# Patient Record
Sex: MALE | Race: WHITE | NOT HISPANIC OR LATINO | URBAN - METROPOLITAN AREA
[De-identification: names, ages, dates, MRNs, and addresses within clinical notes are randomized per-mention and may not be internally consistent; named-entity substitution may affect disease eponyms.]

---

## 2017-01-04 ENCOUNTER — COMMUNICATION - HEALTHEAST (OUTPATIENT)
Dept: SCHEDULING | Facility: CLINIC | Age: 72
End: 2017-01-04

## 2017-01-12 ENCOUNTER — OFFICE VISIT - HEALTHEAST (OUTPATIENT)
Dept: FAMILY MEDICINE | Facility: CLINIC | Age: 72
End: 2017-01-12

## 2017-01-12 DIAGNOSIS — R07.9 CHEST PAIN: ICD-10-CM

## 2017-01-12 DIAGNOSIS — Z71.1 CONCERN ABOUT GALLSTONES WITHOUT DIAGNOSIS: ICD-10-CM

## 2017-01-14 ENCOUNTER — HOSPITAL ENCOUNTER (OUTPATIENT)
Dept: ULTRASOUND IMAGING | Facility: HOSPITAL | Age: 72
Discharge: HOME | End: 2017-01-14
Attending: FAMILY MEDICINE

## 2017-01-14 DIAGNOSIS — Z71.1 CONCERN ABOUT GALLSTONES WITHOUT DIAGNOSIS: ICD-10-CM

## 2017-01-27 ENCOUNTER — RECORDS - HEALTHEAST (OUTPATIENT)
Dept: ADMINISTRATIVE | Facility: OTHER | Age: 72
End: 2017-01-27

## 2017-01-31 ENCOUNTER — COMMUNICATION - HEALTHEAST (OUTPATIENT)
Dept: FAMILY MEDICINE | Facility: CLINIC | Age: 72
End: 2017-01-31

## 2017-01-31 ENCOUNTER — AMBULATORY - HEALTHEAST (OUTPATIENT)
Dept: FAMILY MEDICINE | Facility: CLINIC | Age: 72
End: 2017-01-31

## 2017-01-31 DIAGNOSIS — R07.9 CHEST PAIN WITH HIGH RISK FOR CARDIAC ETIOLOGY: ICD-10-CM

## 2017-04-10 ENCOUNTER — COMMUNICATION - HEALTHEAST (OUTPATIENT)
Dept: NURSING | Facility: CLINIC | Age: 72
End: 2017-04-10

## 2017-04-10 DIAGNOSIS — E78.5 HYPERLIPIDEMIA: ICD-10-CM

## 2017-04-10 DIAGNOSIS — E11.00 TYPE 2 DIABETES MELLITUS WITH HYPEROSMOLARITY WITHOUT COMA, WITHOUT LONG-TERM CURRENT USE OF INSULIN (H): ICD-10-CM

## 2017-05-04 ENCOUNTER — OFFICE VISIT - HEALTHEAST (OUTPATIENT)
Dept: NURSING | Facility: CLINIC | Age: 72
End: 2017-05-04

## 2017-05-04 DIAGNOSIS — E78.5 DYSLIPIDEMIA: ICD-10-CM

## 2017-05-04 DIAGNOSIS — K21.9 GASTROESOPHAGEAL REFLUX DISEASE, ESOPHAGITIS PRESENCE NOT SPECIFIED: ICD-10-CM

## 2017-05-04 DIAGNOSIS — E11.9 TYPE 2 DIABETES MELLITUS WITHOUT COMPLICATION, WITHOUT LONG-TERM CURRENT USE OF INSULIN (H): ICD-10-CM

## 2017-05-04 LAB — HBA1C MFR BLD: 8 % (ref 3.5–6)

## 2017-05-04 RX ORDER — METFORMIN HCL 500 MG
1000 TABLET, EXTENDED RELEASE 24 HR ORAL 2 TIMES DAILY
Qty: 360 TABLET | Refills: 3 | Status: SHIPPED | OUTPATIENT
Start: 2017-05-04

## 2017-05-04 RX ORDER — GLUCOSAMINE HCL/CHONDROITIN SU 500-400 MG
CAPSULE ORAL
Qty: 200 EACH | Refills: 3 | Status: SHIPPED | OUTPATIENT
Start: 2017-05-04

## 2017-05-05 ENCOUNTER — COMMUNICATION - HEALTHEAST (OUTPATIENT)
Dept: NURSING | Facility: CLINIC | Age: 72
End: 2017-05-05

## 2017-05-09 ENCOUNTER — RECORDS - HEALTHEAST (OUTPATIENT)
Dept: ADMINISTRATIVE | Facility: OTHER | Age: 72
End: 2017-05-09

## 2017-07-22 ENCOUNTER — COMMUNICATION - HEALTHEAST (OUTPATIENT)
Dept: NURSING | Facility: CLINIC | Age: 72
End: 2017-07-22

## 2017-07-22 DIAGNOSIS — R03.0 ELEVATED BLOOD PRESSURE READING WITHOUT DIAGNOSIS OF HYPERTENSION: ICD-10-CM

## 2017-07-24 RX ORDER — LISINOPRIL 2.5 MG/1
TABLET ORAL
Qty: 90 TABLET | Refills: 3 | Status: SHIPPED | OUTPATIENT
Start: 2017-07-24

## 2017-10-12 ENCOUNTER — COMMUNICATION - HEALTHEAST (OUTPATIENT)
Dept: FAMILY MEDICINE | Facility: CLINIC | Age: 72
End: 2017-10-12

## 2017-10-12 DIAGNOSIS — E78.5 HYPERLIPIDEMIA: ICD-10-CM

## 2017-10-23 ENCOUNTER — RECORDS - HEALTHEAST (OUTPATIENT)
Dept: ADMINISTRATIVE | Facility: OTHER | Age: 72
End: 2017-10-23

## 2018-01-11 ENCOUNTER — COMMUNICATION - HEALTHEAST (OUTPATIENT)
Dept: FAMILY MEDICINE | Facility: CLINIC | Age: 73
End: 2018-01-11

## 2018-01-11 DIAGNOSIS — E78.5 HYPERLIPIDEMIA: ICD-10-CM

## 2018-01-11 RX ORDER — ATORVASTATIN CALCIUM 10 MG/1
10 TABLET, FILM COATED ORAL DAILY
Qty: 90 TABLET | Refills: 0 | Status: SHIPPED | OUTPATIENT
Start: 2018-01-11

## 2018-12-31 ENCOUNTER — COMMUNICATION - HEALTHEAST (OUTPATIENT)
Dept: FAMILY MEDICINE | Facility: CLINIC | Age: 73
End: 2018-12-31

## 2019-01-23 ENCOUNTER — COMMUNICATION - HEALTHEAST (OUTPATIENT)
Dept: FAMILY MEDICINE | Facility: CLINIC | Age: 74
End: 2019-01-23

## 2020-01-08 ENCOUNTER — COMMUNICATION - HEALTHEAST (OUTPATIENT)
Dept: CARDIOLOGY | Facility: CLINIC | Age: 75
End: 2020-01-08

## 2020-01-13 ENCOUNTER — AMBULATORY - HEALTHEAST (OUTPATIENT)
Dept: CARDIOLOGY | Facility: CLINIC | Age: 75
End: 2020-01-13

## 2020-01-13 DIAGNOSIS — I48.0 PAROXYSMAL ATRIAL FIBRILLATION (H): ICD-10-CM

## 2020-01-13 LAB
ATRIAL RATE - MUSE: 70 BPM
DIASTOLIC BLOOD PRESSURE - MUSE: NORMAL
INTERPRETATION ECG - MUSE: NORMAL
P AXIS - MUSE: 51 DEGREES
PR INTERVAL - MUSE: 152 MS
QRS DURATION - MUSE: 136 MS
QT - MUSE: 422 MS
QTC - MUSE: 455 MS
R AXIS - MUSE: -3 DEGREES
SYSTOLIC BLOOD PRESSURE - MUSE: NORMAL
T AXIS - MUSE: 6 DEGREES
VENTRICULAR RATE- MUSE: 70 BPM

## 2020-01-13 ASSESSMENT — MIFFLIN-ST. JEOR: SCORE: 1438.45

## 2020-01-16 ENCOUNTER — AMBULATORY - HEALTHEAST (OUTPATIENT)
Dept: CARDIOLOGY | Facility: CLINIC | Age: 75
End: 2020-01-16

## 2021-05-30 VITALS — WEIGHT: 179.5 LBS | BODY MASS INDEX: 29.87 KG/M2

## 2021-06-04 VITALS
RESPIRATION RATE: 12 BRPM | WEIGHT: 165.5 LBS | SYSTOLIC BLOOD PRESSURE: 116 MMHG | HEIGHT: 66 IN | BODY MASS INDEX: 26.6 KG/M2 | HEART RATE: 73 BPM | DIASTOLIC BLOOD PRESSURE: 62 MMHG | TEMPERATURE: 97.7 F

## 2021-06-08 NOTE — PROGRESS NOTES
ASSESSMENT AND PLAN:      1. Chest pain is an extensive cardiac workup done at [facility].  Results were reviewed with him today include a negative stress test.  He also had negative troponins.      Differential diagnosis of his chest wall pain which is still present would include possible esophageal spasm and possible referred pain from perceived gallstones noted on x-ray.  He still having the discomfort although it's less with the use of omeprazole.    We'll try a tiered tapproach starting with getting ultrasound of the right upper quadrant.  If this proves to be unremarkable I would then proceed for a barium swallow, if this proves to be normal other than would suggest increase the dose of his PPI as he has had noticed some minor relief with one dose per day     2. Concern about gallstones without diagnosis reviewed on x-ray ultrasound will be obtained  US Abdomen Limited       CHIEF COMPLAINT:  Chief Complaint   Patient presents with     followu     [facility], chest pain, has more pressure then pain per pt.       HISTORY OF PRESENT ILLNESS:  Jerome is a 71 y.o. male presenting for a hospital follow-up. He presented to the [facility] ED on 1/04/2017 for an evaluation of on going chest pain for one week.  While in the ED, he mentioned that the chest pain woke him up from his sleep. He took Rolaids and Tums with no relief of chest pressure. In the ER he was given nitroglycerin and had slight relief of his chest pressure. An EKG was done which was suspicious for acute coronary syndrome. He was them admitted into [facility]. He was consulted by cardiology and a stress test was done which came back normal. He discharged on 1/05/2017 with omeprazole.     He is still experiencing chest pressure and can feel his heart beating. He notes that the pressure will come in episodes. He notes that when he burps, his chest pain improves. He denies worsening during eating or with deep breaths.      REVIEW OF SYSTEMS:   He denies abdominal pain.   All other 10 point review of systems are negative.    PFSH:  Reviewed as below.     TOBACCO USE:  History   Smoking Status     Former Smoker     Packs/day: 1.00     Years: 5.00     Quit date: 1974   Smokeless Tobacco     Former User       VITALS:  Vitals:    01/12/17 1056   BP: 130/74   Patient Site: Left Arm   Patient Position: Sitting   Cuff Size: Adult Regular   Pulse: 96   Resp: 20   Temp: 97.9  F (36.6  C)   TempSrc: Oral   Weight: 179 lb 8 oz (81.4 kg)     Wt Readings from Last 3 Encounters:   01/12/17 179 lb 8 oz (81.4 kg)   01/05/17 173 lb 14.4 oz (78.9 kg)   09/13/16 180 lb 5 oz (81.8 kg)     Body mass index is 29.87 kg/(m^2).    PHYSICAL EXAM:  General: Alert, cooperative, no distress, appears stated age  Throat: Lips, mucosa, and tongue normal; teeth and gums normal  Back: Symmetric, no curvature, ROM normal, no CVA tenderness  Lungs: Clear to auscultation bilaterally, respirations unlabored  Chest wall: No tenderness or deformity  Heart: Regular rate and rhythm, S1 and S2 normal, no murmur, rub, or gallop  Abdomen: Soft, non tender, bowel sounds active all four quadrants, no masses, no organomegaly.  Neurologic:  A & O x 3.  No tremor, no focal findings.       DATA REVIEWED:  Additional History from Old Records Summarized (2): Reviewed Dr. Mcdonnell's note from 1/05/2017 regarding chest pain.   Decision to Obtain Records (1): None  Radiology Tests Summarized or Ordered (1): Reviewed Stress test report from 1/05/2017. Liver US ordered.   Labs Reviewed or Ordered (1):Reviewed Labs from 1/04/2017- 1/05/2017  Medicine Test Summarized or Ordered (1): None  Independent Review of EKG or X-RAY(2 each): None    The visit lasted a total of 12 minutes face to face with the patient. Over 50% of the time was spent counseling and educating the patient about chest pain.     Ayla SAAVEDRA, am scribing for and in the presence of, Dr. Cobian.    Dr. Mango SAAVEDRA,  personally performed the services described in this documentation, as scribed by Ayla Deshpande in my presence, and it is both accurate and complete.      MEDICATIONS:  Current Outpatient Prescriptions   Medication Sig Dispense Refill     aspirin 81 MG EC tablet Take 81 mg by mouth daily.       atorvastatin (LIPITOR) 10 MG tablet Take 1 tablet (10 mg total) by mouth daily. 90 tablet 3     glipiZIDE (GLUCOTROL) 10 MG 24 hr tablet Take 1 tablet (10 mg total) by mouth daily. 90 tablet 2     lisinopril (PRINIVIL,ZESTRIL) 2.5 MG tablet Take 1 tablet (2.5 mg total) by mouth daily. 90 tablet 3     metFORMIN (GLUCOPHAGE-XR) 500 MG 24 hr tablet Take 1,000 mg by mouth 2 (two) times a day.       omeprazole (PRILOSEC) 20 MG capsule Take 1 capsule (20 mg total) by mouth Daily before breakfast. 30 capsule 0     blood glucose test strips Use 1 each As Directed as needed. Dispense brand per patient's insurance at pharmacy discretion. 100 each 11     blood-glucose meter (ONETOUCH VERIO IQ METER) Misc Test once daily 1 each 0     cetirizine (ZYRTEC) 10 MG tablet Take 10 mg by mouth daily as needed.        generic lancets (ACCU-CHEK SOFTCLIX LANCETS) Dispense brand per patient's insurance at pharmacy discretion. 100 each 11     No current facility-administered medications for this visit.        Total Data Points: 4

## 2021-06-10 NOTE — PROGRESS NOTES
MTM Encounter    Assessment/Plan  Acid reflux: We discussed the mechanism of omeprazole and pharmacokinetics.  We agreed that ranitidine would be a better as needed medication to use for his occasional acid reflux.  Jerome was advised to pretreat with Zantac if he was going to eat or drink something that he knew would exacerbate his acid reflux.  -Stop omeprazole  -Start ranitidine 150 mg twice daily as needed OTC    Diabetes: Nathaniel co-pay for his Glumetza has been about $100 every 3 months.  This medication costs approximately $2000 per month cash price.  We will switch him to metformin  mg tablets in order to reduce his cost significantly.  Jerome is also due for an A1c, his blood sugars have been increasing as of late.  If his A1c is greater than 7.2% we agreed to increase his glipizide.  We also reviewed his overall A1c trend over the past 5 years which has generally ranged from 7-8%.  If his microalbumin is elevated, we also discussed an increase in his lisinopril, likely to 5mg.  -A1c  -microalbumin  -Increase glipizide to 10 mg twice daily  -Stop Glumetza  -Start metformin  mg 2 tablets twice daily    Dyslipidemia: We reviewed the improvement in LDL since switching from pravastatin to atorvastatin.  Jerome's cholesterol is under excellent control and he has not had any medication changes recently.  I suggested that we repeat lipid cascade only once yearly.  -Continue current therapy    Follow Up  As requested      Subjective/Objective  Jerome Knott is a 71 y.o. male here for a medication therapy management (MTM) appointment; his chief concern today is 1 year follow-up for them to him study.    Acid reflux: During Nathaniel hospitalization in January, he was started on omeprazole for acid reflux which was thought to be the cause of his chest pain.  This was helpful however he discontinued 1 month ago.  He still has occasional acid reflux, especially when he drinks a lot of coffee, and wonders  how long his omeprazole is good for her.    Diabetes: Taking Glumetza and glipizide.  Jerome has occasional neuropathy in his hands and his feet.  On aspirin and moderate intensity statin.  Recent a.m. fasting glucoses have ranged from 140-200.  Later on in the day, when Jerome checks his blood sugar occasionally his glucose readings are lower.  ----------------    This patient agreed to participate in the [facility]  study The patient was asked to participate in the on this date, and agreed to participate. I reviewed the informed consent document with the patient in detail. All of the patients questions were answered. The patient voluntarily agreed to share medical information receive [facility]. Kervin Garcia PharmD       Jerome's medication list was reviewed with them, discussing reason for use, directions for use, and potential side effects of each medication as needed. Indication, safety, efficacy, and convenience was assessed for all medications addressed above.  No environmental factors were noted currently affecting patient.  This care plan was communicated via EMR with his primary care provider, Mynor Cobian MD, who is the authorizing prescriber for this visit.  Direct supervision was available by either the patient's PCP or other available provider.  Time and complexity billing metrics are included in the docflowsheet linked to this visit.  Time spent: 40 minutes      Kervin Garcia PharmD  [facility] Pharmacist  [facility]  [phone]

## 2021-06-15 PROBLEM — R07.9 CHEST PAIN WITH HIGH RISK FOR CARDIAC ETIOLOGY: Status: ACTIVE | Noted: 2017-01-04

## 2021-06-15 PROBLEM — I20.0 UNSTABLE ANGINA (H): Status: ACTIVE | Noted: 2017-01-04

## 2021-06-15 PROBLEM — R07.9 CHEST PAIN: Status: ACTIVE | Noted: 2017-01-05

## 2021-06-24 NOTE — TELEPHONE ENCOUNTER
Patient called and said that he no longer is a patient at [facility]. Due to his insurance he transferred his care over to [facility] about 1 year ago.

## 2021-06-24 NOTE — TELEPHONE ENCOUNTER
CMT left message x 2 ([facility] called 01/23/19).  Please review message thread below and advise the patient as indicated. Please schedule if necessary or indicated in message thread.  CMT will attempt to reach the patient x 3 per clinical protocol before sending a letter to prompt patient follow up.

## 2021-06-24 NOTE — TELEPHONE ENCOUNTER
The patient is due for a diabetic follow up appointment with PCP. [facility] intends to help the patient schedule this appointment.

## 2021-06-28 NOTE — PROGRESS NOTES
Progress Notes by Bonifacio Birmingham at 1/13/2020  8:00 AM     Author: Bonifacio Birmingham Service: -- Author Type: Patient Access    Filed: 1/13/2020 10:51 AM Encounter Date: 1/13/2020 Status: Signed    : Bonifacio Birmingham (Patient Access)            Zestar Study Consent Visit    Study description: ECG and PPG Study: Zestar Study      Note time seated: 0806     Jerome Knott a 74 y.o. male , was seen in [facility] today to discuss participation in the Zestar study.   The consent discussion began on 1/8/2020.  Please refer to phone call note from Janae Wilson for more details.  The consent form was reviewed with the patient.     The review of the study included:    Study purpose     Conflict of interest    Device description      Study visits    Risks of participation    Benefits (if any)    Alternatives    Voluntary participation    Confidentiality     Compensation/costs of participation    Study stipends    Injury and legal rights    The subject was provided time to review the consent form and consider participation. his questions were answered to his satisfaction.   The patient has voluntarily agreed to participate in the above noted study.     The consent form version 25 Nov 2019 and HIPAA form version 11 Jun 2019 was signed 01/13/20 at 0819    The subject was provided with a copy of the consent form and HIPAA. A copy of the signed forms was forwarded to medical records.    No study procedures were done prior to Jerome Knott providing informed consent.     Bonifacio Birmingham    Subject Restrictions During Study -Confirmed with Subject prior to any study procedures completed    Restrictions on jewelry, recreational drugs, caffeine, and exercise few days prior and during study.   1. Subjects should not consume excessive amount of caffeine (6 or more 8-oz cups of coffee, or more than 570 mg of caffeine from energy drinks, pills or similar substance) during their participation in the study.   2. Subjects should not consume  "excessive amount of alcohol for the duration of their participation in the study. A typical moderate amount is allowed during stage 3.   3. Subjects should not take any recreational drugs (including, but not limited to methamphetamines, cocaine, opioids, cannabis, LSD) for the duration of their participation in the study.   4. Subjects should not wear underwire bra or jewelry during the in-lab study (to not interfere with electrode placement and ECG data recordings).   5. Subject will not be permitted to have their cell phone or any electronic recording device on or with them during the in-lab test session(s).   6. Subjects under 22 years old will not be permitted to take ECG recordings through the ECG jules on the wrist-worn devices.     For study stage 3 only   1. Subjects should only do high intensity exercise (e.g. sprinting, heavy lifting, etc.) in the morning upon awakening or else not at all   2. Subjects should abstain from swimming during the time of the study   3. Subjects should only shower in the morning upon awakening (or else not at all)   4. Female subjects are strongly suggested to wear non-underwire bras throughout this stage of the study     Bonifacio Birmingham      Study Data collections   Vitals  (TPBP)     Vitals:    01/13/20 0822 01/13/20 0824 01/13/20 0825   BP: 119/64 116/63 116/62   Patient Site: Right Arm Right Arm Right Arm   Patient Position: Sitting Sitting Sitting   Cuff Size: Adult Regular Adult Regular Adult Regular   Pulse: 77 73 73   Resp: 12     Temp: 97.7  F (36.5  C)     Weight:   165 lb 8 oz (75.1 kg)   Height:   5' 6\" (1.676 m)      VS taken after 5 min rest     MAP 1    86  MAP 2      82   MAP 3            82          Body mass index is 26.71 kg/m .  male  1945  74 y.o.      Note time patient placed in supine position: 0827    Ethnicity   []   or    [x]  Not  or   Race   []   or    []    []  Black or  " American  []   or Other   [x]  White  Physical Activity Level  per subject report:   []  0- Extremely Inactive []  1- Sedentary []  2- Moderately Active  [x]  3- Vigorously Active []  4- Extremely Active  Trained Athlete   [] Yes  [x] No     [place]'s' Skin type   [] Type 1 [] Type 2 [x] Type 3    [] Type 4 [] Type 5 [] Type 6    Subject participated in previous ECG study at [facility]: [] Yes    [x] No    Past Medical History:   Diagnosis Date   ? Diabetes mellitus, type 2 (H) 2012   ? Family history of myocardial infarction    ? HLD (hyperlipidemia) 2014   ? HTN (hypertension) 2014       HISTORY OF HEART RHYTHM ABNORMALITIES (check only group subject is 'randomized[' to-only 1)  []  Group 1: History of paroxysmal atrial fibrillation (no excluded medications)  []  Group 2: History of paroxysmal atrial fibrillation (on excluded medications)    []  Group 3: History of high-rate atrial fibrillation   []  Group 4: History of atrial fibrillation with rate control medications    []  Group 5: Permanent/persistent atrial fibrillation    []  Group 6: history of atrial flutter  []  Group 7: Frequent PVCs  []  Group 8: Frequent PACs  [x]  Group 9: BBB (left or right)  []  Group 10: History of 2nd Heart Block (any type)  []  Group 11: History of bigeminy, trigeminy, and/or quadgeminy  []  Group 12: History of tachycardia     Special interest allergies: active allergic skin reactions  No Known Allergies    Current Outpatient Medications:   ?  aspirin 81 MG EC tablet, Take 81 mg by mouth daily., Disp: , Rfl:   ?  atorvastatin (LIPITOR) 10 MG tablet, Take 1 tablet (10 mg total) by mouth daily., Disp: 90 tablet, Rfl: 0  ?  blood glucose test strips, Use to check blood sugar twice daily.  One Touch Ultra 2, Disp: 200 each, Rfl: 3  ?  cetirizine (ZYRTEC) 10 MG tablet, Take 10 mg by mouth daily as needed. , Disp: , Rfl:   ?  lancets (ONETOUCH DELICA LANCETS) 30 gauge Misc, Use to check blood sugar  "twice daily., Disp: 200 each, Rfl: 3  ?  lisinopril (PRINIVIL,ZESTRIL) 2.5 MG tablet, TAKE 1 TABLET DAILY, Disp: 90 tablet, Rfl: 3  ?  metFORMIN (GLUCOPHAGE-XR) 500 MG 24 hr tablet, Take 2 tablets (1,000 mg total) by mouth 2 (two) times a day., Disp: 360 tablet, Rfl: 3      10-sec 12-lead ECG & 30-sec 12-lead ECG rhythm strip done; reviewed by & PE done by Adrianna Russell  Subject Questionnaire    OCCUPATION: Retired    Predominately works outdoors  [] Yes    [x] No      Hours/week spent outdoors (total, not only for work): 5    Frequently participates in \"hand intensive\" activities [] Yes [x] No  Caffeine  []  Never  [] Occasionally        []  Daily (1 cup/day)     [x]  Daily (>1 cup/day)    Alcohol   []  Never  [] Light (drink or 2 occasionally) [x]  Moderate (a drink or 2 almost daily)   []  Occasional-heavy (more than a few drinks <2x / month)  [] Heavy (more than a few drinks >2x / month)    Tobacco/nicotine  [x]  Never  [] Rarely  []  Frequently/ Daily     Mattress Information    Mattress type:  []  Memory foam [] Gel  [] Innerspring (coil)  [] Airbed  [] Waterbed [] Shikibuton  [] Hybrid  [] No mattress  [x] Other (comment): Sleep Number   Mattress foundation   [] Mattress on floor/ground    [] Mattress on foundation/box spring on floor/ground  [x] Mattress on foundation/box spring on bed frame  [] Mattress on tatami on floor/ground  [] Other (comment):    Mattress topper   [x] No mattress topper  [] Pillow top  [] Foam top - flat style  [] Foam top - \"egg crate\" style  [] Other (comment):    Co-sleeper    [x]  Yes  []  No    CPAP use   [] Yes  [x] No      Dominant hand [x] left  [] right [] ambidextrous  Preferred Wrist to wear band on   []  left   [x]  right   Were screening day & study day: [x]  same [] different   Same: wrist circumference:      160   mm   Device wearing wrist skin fold thickness:       4.4  mm  Wrist Band Size:     [x]  Flush Fit S/M  []  Non-Flush Fit S/M   []  Flush Fit M/L  []  Non-Flush " Fit M/L  []  Flush Fit XL  []  Non-Flush Fit XL    Preferred/natural band notch: 4  Secure band notch: 4  Crown orientation:  [x]  left   []  right  Device wearing wrist hairiness:     []  Light [x]  Medium       []  Heavy  Spectophotometer   L A B   Reading #1  61.76  7.76  18.06   Reading #2  60.26  11.24  19.57   Reading #3  59.72  9.65  18.88     Pregnancy test    [] WOCBP (age <55 yrs, no tubal ligation, no hysterectomy)    [x] n/a male or female not child bearing potential  For Stage 2: subject will use exercise bike for exercise portion of the study  Room Temperature: 18 C  CS Laptop ID: [id number]  CS Cam ID:[id number]  Device Set ID:[id number]  Wrist Device ID:[id number]  Subject has now completed their in-house participation in the Zestar study. Subject will complete Stage 3 at home for the next 3 days and return the equipment on 1/16/20.  Bonifacio Birmingham

## 2021-06-28 NOTE — PROGRESS NOTES
Progress Notes by Adrianna Russell PA-C at 1/13/2020  8:00 AM     Author: Adrianna Russell PA-C Service: -- Author Type: Physician Assistant    Filed: 1/13/2020 10:51 AM Encounter Date: 1/13/2020 Status: Signed    : Adrianna Russell PA-C (Physician Assistant)        Zestar Study    Physical Examination  For abnormal findings, please evaluate if the finding is Clinically Significant (by 'CS') or Not Clinically Significant (by 'NCS')  General Appearance Normal  Head and Neck  Normal  Lungs    Normal  Cardiovascular  Normal  Abdomen   Normal  Musculoskeletal/Extremities Normal   Lymph Nodes  Normal  Skin    Normal  Neurological   Normal   Tremor absent     If present, evaluate severity on 1-10 scale    Adrianna Russell PA-C

## 2021-06-28 NOTE — PROGRESS NOTES
Progress Notes by Behr-Holewinski, Sierra, RN at 1/16/2020 11:09 AM     Author: Behr-Holewinski, Sierra, RN Service: -- Author Type: Patient Access    Filed: 1/16/2020 11:09 AM Encounter Date: 1/16/2020 Status: Signed    : Behr-Holewinski, Sierra, RN (Registered Nurse)         Zestar Study Device Return    Jerome Knott returned all the devices for the Zestar study.  Jerome Knott denies medication changes or adverse events since last visit.    Jerome Knott has now completed their participation in the Zestar study.   Thank you for your gift of participation.    Sierra Behr-Holewinski

## 2021-06-28 NOTE — PROGRESS NOTES
Progress Notes by Bonifacio Birmingham at 1/13/2020  8:00 AM     Author: Bonifacio Birmingham Service: -- Author Type: Patient Access    Filed: 1/13/2020  9:10 PM Encounter Date: 1/13/2020 Status: Signed    : Brennan Ybarra MD (Physician)    Related Notes: Original Note by Bonifacio Birmingham (Patient Access) filed at 1/13/2020 10:51 AM           [facility] Study Inclusion / Exclusion Criteria  Protocol Version 4.0 ([id number])    Inclusion Criteria    Yes No Criteria # Subject must meet all inclusion criteria:      [x]    []  1 At least 18 years old for NSR and 22 years old for Non-NSR. Inclusive for both cohorts, at time of screening, with no upper limit on age.        [x]      []  2 To be enrolled as a non-NSR subject the volunteer must have one of the following conditions: Permanent/Persistent AF, Hx of paroxysmal AF, Hx of High-rate AF, AF + rate control medication, Hx Atrial Flutter, PVC burden >1%, Frequent PACs, Hx BBB, Hx 2nd degree block (any type), Hx Bigeminy/Trigeminy/Quadgeminy, Hx Tachycardia. For subjects with any of the following diagnoses, the condition must be present at the time of screening:   ? Permanent/persistent AF  ? PVC [place]  ? Frequent PACs   Note: If not present at screening, subjects may not be enrolled as a non-NSR subject or NSR subject.         3  [x] N/A HE site For Subjects to be enrolled as NSR they must be in NSR at the time of screening as determined by the investigator. For subjects ?65 years old with PVC burden of ?1% or infrequent PACs (<3 ectopic beats in 30 seconds), they may qualify as individuals in the NSR cohort as long as they don't have a medical history/diagnosis of significant ectopic burden. For subjects ? 66 years old with PVC burden > 1% but ?10%, they may qualify as individuals in the NSR cohort as long as they don't have a medical history/diagnosis of significant ectopic burden.    [x]  []  4 Able to read and understand a written ICF    [x]  []  5 Willing and able to participate in  the study procedures and comply with its restrictions    [x]  []  6 Able to communicate effectively with study staff as well as understand and follow directions    All must be Yes    Exclusion Criteria-all must be no  Yes No Criteria # Subject must not meet any exclusion criteria:    []  [x]  1 Physical disability that prevents safe and adequate testing.   []  [x]  2 Pregnant women or women planning to become pregnant   []  [x]  3 Any acute illness or condition that may interfere with study procedures (e.g. cough, fever, sore throat, headache, sunburn, etc.)   []  [x]  4 Clinically significant hand tremors, as judged by the Investigator   []  [x]  5 Resting hypertension with systolic blood pressure ?161 mmHg or diastolic blood pressure ?101 mmHg (if at least 2 of 3 measurements meet this criteria)   []  [x]  6 Subjects with a pacemaker or an automated implantable cardioverter- defibrillator (AICD)   []  [x]  7 Acute myocardial infarction (MI) within 90 days from the screening visit   []  [x]  8 Other cardiovascular disease that increases the risk to the subject or would render the data uninterpretable in the opinion of the Investigator (e.g., recent or ongoing unstable angina, significant valvular heart disease or chronic heart failure, myocarditis or pericarditis)    []  [x]  9 Acute pulmonary embolism, pulmonary infarction, or deep vein thrombosis within 90 days from the screening visit    []  [x]  10 Stroke or transient ischemic attack within 90 days from the screening visit    []  [x]  11 Known untreated medical conditions as determined by the Investigator, such as but not limited to significant anemia, important electrolyte imbalance and untreated or uncontrolled thyroid disease.    []  [x]  12 Any history of wrist surgery with scarring in the area of the sensor location on the wrist where the subject will be wearing the watch;    []  [x]  13 Open wound(s) on the wrist and forearm where the subject will be  wearing the watch    []  [x]  14 Severe symptomatic (or active) overly dry/injured skin, skin disorders, or allergic skin reactions such as eczema, rosacea, impetigo, dermatomyositis or allergic contact dermatitis on wrist and locations where the electrodes will be placed (e.g. chest, forearms, stomach), as determined by the investigator.    []  [x]  15 Tattoos, scars or moles in the area of the sensor location on the wrist where the subject will be wearing the watch    []  [x]  16 Device wearing Wrist circumference ? 129 mm or ? 246 mm    []  [x]  17 Known significant sensitivity to medical adhesives or isopropyl alcohol (for ECG electrode placement)    []  [x]  18 Known allergy or sensitivity to fluorocarbon-based synthetic rubber, such as contact dermatitis with fluoroelastomer bands primarily used in wrist worn fitness devices    []  [x]  19 Subjects with any Medical History, Physical exam, vital sign or any other study procedure finding/assessment that in the opinion of the investigator could compromise subject safety during study participation or interfere with the study integrity and/or the accurate assessment of the study objectives    []  [x]  20 Subject works for a company that develops or sells medical and/or fitness devices (e.g., ECG monitors, wearable fitness bands, sleep monitors, etc.) or are technology journalists (e.g., professional bloggers, TV, magazine, newspaper reporters, etc.)    []  [x]  21 Weight > 181 kg for subjects using the stationary bike and/or treadmill. Weight of ?138 kg for NSR subjects.    []  [x]  22 Subject is employed in shift work, or otherwise does not maintain a reasonably consistent day/night schedule (e.g. Subjects who go to bed after 4am).    []  [x]  23 Overnight travel planned during data collection nights    []  [x]  24 Non-NSR subjects should not have partaken in strenuous physical activity within 12 hours prior to screening    []  [x]  25 Non-NSR subjects with Atrial  fibrillation categories: Subjects taking Class 1 or Class 3 antiarrhythmic agents such as the following may not take part in any stage of the study: amiodarone, sotalol, dronedarone, ibutilide, dofetilide, propafenone, quinidine, procainamide, disopyramide, flecainide (Subjects taking class 2, 4 or 5 antiarrhythmic agents may take part the study).    []  [x]  26 Subjects who have both a history of paroxysmal AF and a [place] skin type measurement of VI    []  [x]  27 Subjects who have missing index fingers on both hands      Subject has met all inclusion criteria and no exclusion criteria have been met.   Subject is ready to fully enrolled in the [facility] study.    Bonifacio Birmingham

## 2021-07-03 NOTE — ADDENDUM NOTE
Addendum Note by Kervin Garcia PharmD at 5/4/2017  3:07 PM     Author: Kervin Garcia PharmD Service: -- Author Type: Pharmacist    Filed: 5/4/2017  3:07 PM Encounter Date: 5/4/2017 Status: Signed    : Kervin Garcia PharmD (Pharmacist)    Addended by: KERVIN GARCIA on: 5/4/2017 03:07 PM        Modules accepted: Orders

## 2021-08-15 ENCOUNTER — HEALTH MAINTENANCE LETTER (OUTPATIENT)
Age: 76
End: 2021-08-15

## 2021-10-11 ENCOUNTER — HEALTH MAINTENANCE LETTER (OUTPATIENT)
Age: 76
End: 2021-10-11

## 2022-03-27 ENCOUNTER — HEALTH MAINTENANCE LETTER (OUTPATIENT)
Age: 77
End: 2022-03-27

## 2022-09-25 ENCOUNTER — HEALTH MAINTENANCE LETTER (OUTPATIENT)
Age: 77
End: 2022-09-25

## 2023-01-30 ENCOUNTER — HEALTH MAINTENANCE LETTER (OUTPATIENT)
Age: 78
End: 2023-01-30

## 2023-08-05 ENCOUNTER — HEALTH MAINTENANCE LETTER (OUTPATIENT)
Age: 78
End: 2023-08-05

## 2023-10-14 ENCOUNTER — HEALTH MAINTENANCE LETTER (OUTPATIENT)
Age: 78
End: 2023-10-14

## 2024-03-02 ENCOUNTER — HEALTH MAINTENANCE LETTER (OUTPATIENT)
Age: 79
End: 2024-03-02